# Patient Record
Sex: FEMALE | Race: BLACK OR AFRICAN AMERICAN | Employment: FULL TIME | URBAN - METROPOLITAN AREA
[De-identification: names, ages, dates, MRNs, and addresses within clinical notes are randomized per-mention and may not be internally consistent; named-entity substitution may affect disease eponyms.]

---

## 2017-01-09 ENCOUNTER — HOSPITAL ENCOUNTER (OUTPATIENT)
Dept: GENERAL RADIOLOGY | Age: 47
Discharge: HOME | End: 2017-01-09
Attending: SURGERY | Admitting: SURGERY

## 2017-01-09 DIAGNOSIS — K91.0 VOMITING FOLLOWING GASTROINTESTINAL SURGERY: ICD-10-CM

## 2017-01-09 DIAGNOSIS — K91.0: ICD-10-CM

## 2017-09-11 ENCOUNTER — HOSPITAL ENCOUNTER (OUTPATIENT)
Dept: ENDOSCOPY | Age: 47
Discharge: HOME | End: 2017-09-11
Attending: SURGERY | Admitting: SURGERY

## 2017-09-11 VITALS
OXYGEN SATURATION: 98 % | RESPIRATION RATE: 18 BRPM | HEIGHT: 67 IN | DIASTOLIC BLOOD PRESSURE: 76 MMHG | TEMPERATURE: 98.3 F | BODY MASS INDEX: 29.82 KG/M2 | SYSTOLIC BLOOD PRESSURE: 128 MMHG | HEART RATE: 77 BPM | WEIGHT: 190 LBS

## 2017-09-11 LAB — PREGNANCY, URINE: NEGATIVE

## 2017-09-11 RX ORDER — PHENTERMINE HYDROCHLORIDE 37.5 MG/1
TABLET ORAL DAILY
Refills: 0 | COMMUNITY
Start: 2017-08-21 | End: 2018-10-16

## 2017-09-11 RX ORDER — ESCITALOPRAM OXALATE 10 MG/1
10 TABLET ORAL DAILY
COMMUNITY

## 2018-10-16 ENCOUNTER — HOSPITAL ENCOUNTER (EMERGENCY)
Age: 48
Discharge: HOME | End: 2018-10-16
Attending: EMERGENCY MEDICINE
Payer: COMMERCIAL

## 2018-10-16 ENCOUNTER — APPOINTMENT (OUTPATIENT)
Dept: CT IMAGING | Age: 48
End: 2018-10-16
Payer: COMMERCIAL

## 2018-10-16 VITALS
WEIGHT: 174 LBS | OXYGEN SATURATION: 100 % | RESPIRATION RATE: 16 BRPM | DIASTOLIC BLOOD PRESSURE: 112 MMHG | HEART RATE: 78 BPM | HEIGHT: 66 IN | TEMPERATURE: 97.8 F | BODY MASS INDEX: 27.97 KG/M2 | SYSTOLIC BLOOD PRESSURE: 138 MMHG

## 2018-10-16 DIAGNOSIS — R10.13 ABDOMINAL PAIN, EPIGASTRIC: Primary | ICD-10-CM

## 2018-10-16 DIAGNOSIS — R11.2 NAUSEA AND VOMITING, INTRACTABILITY OF VOMITING NOT SPECIFIED, UNSPECIFIED VOMITING TYPE: ICD-10-CM

## 2018-10-16 LAB
ALBUMIN SERPL-MCNC: 4.3 G/DL (ref 3.4–5)
ALP BLD-CCNC: 64 U/L (ref 40–129)
ALT SERPL-CCNC: 15 U/L (ref 10–40)
ANION GAP SERPL CALCULATED.3IONS-SCNC: 9 MMOL/L (ref 3–16)
AST SERPL-CCNC: 18 U/L (ref 15–37)
BASOPHILS ABSOLUTE: 0 K/UL (ref 0–0.2)
BASOPHILS RELATIVE PERCENT: 0.5 %
BILIRUB SERPL-MCNC: 0.6 MG/DL (ref 0–1)
BILIRUBIN DIRECT: <0.2 MG/DL (ref 0–0.3)
BILIRUBIN URINE: NEGATIVE
BILIRUBIN, INDIRECT: NORMAL MG/DL (ref 0–1)
BLOOD, URINE: NEGATIVE
BUN BLDV-MCNC: 8 MG/DL (ref 7–20)
CALCIUM SERPL-MCNC: 10 MG/DL (ref 8.3–10.6)
CHLORIDE BLD-SCNC: 103 MMOL/L (ref 99–110)
CLARITY: CLEAR
CO2: 28 MMOL/L (ref 21–32)
COLOR: YELLOW
CREAT SERPL-MCNC: 0.9 MG/DL (ref 0.6–1.1)
EOSINOPHILS ABSOLUTE: 0 K/UL (ref 0–0.6)
EOSINOPHILS RELATIVE PERCENT: 0.2 %
GFR AFRICAN AMERICAN: >60
GFR NON-AFRICAN AMERICAN: >60
GLUCOSE BLD-MCNC: 136 MG/DL (ref 70–99)
GLUCOSE URINE: NEGATIVE MG/DL
HCG(URINE) PREGNANCY TEST: NEGATIVE
HCT VFR BLD CALC: 45.2 % (ref 36–48)
HEMOGLOBIN: 14.9 G/DL (ref 12–16)
KETONES, URINE: ABNORMAL MG/DL
LEUKOCYTE ESTERASE, URINE: NEGATIVE
LIPASE: 41 U/L (ref 13–60)
LYMPHOCYTES ABSOLUTE: 1 K/UL (ref 1–5.1)
LYMPHOCYTES RELATIVE PERCENT: 17.6 %
MCH RBC QN AUTO: 30.3 PG (ref 26–34)
MCHC RBC AUTO-ENTMCNC: 33 G/DL (ref 31–36)
MCV RBC AUTO: 91.8 FL (ref 80–100)
MICROSCOPIC EXAMINATION: ABNORMAL
MONOCYTES ABSOLUTE: 0.2 K/UL (ref 0–1.3)
MONOCYTES RELATIVE PERCENT: 3.2 %
NEUTROPHILS ABSOLUTE: 4.5 K/UL (ref 1.7–7.7)
NEUTROPHILS RELATIVE PERCENT: 78.5 %
NITRITE, URINE: NEGATIVE
PDW BLD-RTO: 13.7 % (ref 12.4–15.4)
PH UA: 7.5
PLATELET # BLD: 234 K/UL (ref 135–450)
PMV BLD AUTO: 9.5 FL (ref 5–10.5)
POTASSIUM REFLEX MAGNESIUM: 4.3 MMOL/L (ref 3.5–5.1)
PROTEIN UA: NEGATIVE MG/DL
RBC # BLD: 4.92 M/UL (ref 4–5.2)
SODIUM BLD-SCNC: 140 MMOL/L (ref 136–145)
SPECIFIC GRAVITY UA: 1.01
TOTAL PROTEIN: 7.2 G/DL (ref 6.4–8.2)
URINE TYPE: ABNORMAL
UROBILINOGEN, URINE: 1 E.U./DL
WBC # BLD: 5.7 K/UL (ref 4–11)

## 2018-10-16 PROCEDURE — 96374 THER/PROPH/DIAG INJ IV PUSH: CPT

## 2018-10-16 PROCEDURE — 80048 BASIC METABOLIC PNL TOTAL CA: CPT

## 2018-10-16 PROCEDURE — 6370000000 HC RX 637 (ALT 250 FOR IP): Performed by: EMERGENCY MEDICINE

## 2018-10-16 PROCEDURE — 85025 COMPLETE CBC W/AUTO DIFF WBC: CPT

## 2018-10-16 PROCEDURE — 99284 EMERGENCY DEPT VISIT MOD MDM: CPT

## 2018-10-16 PROCEDURE — 6360000002 HC RX W HCPCS: Performed by: PHYSICIAN ASSISTANT

## 2018-10-16 PROCEDURE — 81003 URINALYSIS AUTO W/O SCOPE: CPT

## 2018-10-16 PROCEDURE — 6360000004 HC RX CONTRAST MEDICATION: Performed by: EMERGENCY MEDICINE

## 2018-10-16 PROCEDURE — 96375 TX/PRO/DX INJ NEW DRUG ADDON: CPT

## 2018-10-16 PROCEDURE — 84703 CHORIONIC GONADOTROPIN ASSAY: CPT

## 2018-10-16 PROCEDURE — 74177 CT ABD & PELVIS W/CONTRAST: CPT

## 2018-10-16 PROCEDURE — 83690 ASSAY OF LIPASE: CPT

## 2018-10-16 PROCEDURE — 2580000003 HC RX 258: Performed by: PHYSICIAN ASSISTANT

## 2018-10-16 PROCEDURE — 80076 HEPATIC FUNCTION PANEL: CPT

## 2018-10-16 PROCEDURE — 96376 TX/PRO/DX INJ SAME DRUG ADON: CPT

## 2018-10-16 PROCEDURE — 96361 HYDRATE IV INFUSION ADD-ON: CPT

## 2018-10-16 RX ORDER — OMEPRAZOLE 40 MG/1
40 CAPSULE, DELAYED RELEASE ORAL DAILY
Qty: 30 CAPSULE | Refills: 3 | Status: ON HOLD | OUTPATIENT
Start: 2018-10-16 | End: 2020-02-02 | Stop reason: SDUPTHER

## 2018-10-16 RX ORDER — 0.9 % SODIUM CHLORIDE 0.9 %
1000 INTRAVENOUS SOLUTION INTRAVENOUS ONCE
Status: COMPLETED | OUTPATIENT
Start: 2018-10-16 | End: 2018-10-16

## 2018-10-16 RX ORDER — OXYCODONE HYDROCHLORIDE AND ACETAMINOPHEN 5; 325 MG/1; MG/1
1 TABLET ORAL EVERY 4 HOURS PRN
Qty: 20 TABLET | Refills: 0 | Status: SHIPPED | OUTPATIENT
Start: 2018-10-16 | End: 2018-10-23

## 2018-10-16 RX ORDER — ONDANSETRON 8 MG/1
8 TABLET, ORALLY DISINTEGRATING ORAL EVERY 8 HOURS PRN
Qty: 10 TABLET | Refills: 0 | Status: SHIPPED | OUTPATIENT
Start: 2018-10-16 | End: 2020-01-30 | Stop reason: ALTCHOICE

## 2018-10-16 RX ORDER — HYDROCODONE BITARTRATE AND ACETAMINOPHEN 5; 325 MG/1; MG/1
1-2 TABLET ORAL EVERY 6 HOURS PRN
Qty: 20 TABLET | Refills: 0 | Status: SHIPPED | OUTPATIENT
Start: 2018-10-16 | End: 2018-10-16 | Stop reason: CLARIF

## 2018-10-16 RX ORDER — ONDANSETRON 2 MG/ML
4 INJECTION INTRAMUSCULAR; INTRAVENOUS ONCE
Status: COMPLETED | OUTPATIENT
Start: 2018-10-16 | End: 2018-10-16

## 2018-10-16 RX ORDER — PROMETHAZINE HYDROCHLORIDE 25 MG/ML
12.5 INJECTION, SOLUTION INTRAMUSCULAR; INTRAVENOUS ONCE
Status: COMPLETED | OUTPATIENT
Start: 2018-10-16 | End: 2018-10-16

## 2018-10-16 RX ORDER — PROMETHAZINE HYDROCHLORIDE 25 MG/1
25 TABLET ORAL EVERY 6 HOURS PRN
Qty: 20 TABLET | Refills: 0 | Status: SHIPPED | OUTPATIENT
Start: 2018-10-16 | End: 2018-10-23

## 2018-10-16 RX ADMIN — IOPAMIDOL 75 ML: 755 INJECTION, SOLUTION INTRAVENOUS at 15:35

## 2018-10-16 RX ADMIN — SODIUM CHLORIDE 1000 ML: 9 INJECTION, SOLUTION INTRAVENOUS at 12:48

## 2018-10-16 RX ADMIN — ONDANSETRON HYDROCHLORIDE 4 MG: 2 SOLUTION INTRAMUSCULAR; INTRAVENOUS at 14:49

## 2018-10-16 RX ADMIN — PROMETHAZINE HYDROCHLORIDE 12.5 MG: 25 INJECTION INTRAMUSCULAR; INTRAVENOUS at 12:48

## 2018-10-16 RX ADMIN — PROMETHAZINE HYDROCHLORIDE 12.5 MG: 25 INJECTION INTRAMUSCULAR; INTRAVENOUS at 15:57

## 2018-10-16 RX ADMIN — LIDOCAINE HYDROCHLORIDE: 20 SOLUTION ORAL; TOPICAL at 16:58

## 2018-10-16 ASSESSMENT — ENCOUNTER SYMPTOMS
EYES NEGATIVE: 1
RECTAL PAIN: 0
BLOOD IN STOOL: 0
ABDOMINAL PAIN: 1
RESPIRATORY NEGATIVE: 1
VOMITING: 1
DIARRHEA: 1
NAUSEA: 1

## 2018-10-16 ASSESSMENT — PAIN DESCRIPTION - ORIENTATION: ORIENTATION: MID;UPPER

## 2018-10-16 ASSESSMENT — PAIN SCALES - GENERAL: PAINLEVEL_OUTOF10: 7

## 2018-10-16 ASSESSMENT — PAIN DESCRIPTION - PAIN TYPE: TYPE: ACUTE PAIN

## 2018-10-16 ASSESSMENT — PAIN DESCRIPTION - LOCATION: LOCATION: ABDOMEN

## 2018-10-16 NOTE — ED NOTES
Assume patient care at this time. Agree with previous assessment - fluids running per order. Current Plan of Care reviewed with patient. Pt denies any needs or questions at this time. Bed locked and in low position, with side rails up x 2.       Kathleen Seals RN  10/16/18 7957

## 2018-10-16 NOTE — ED PROVIDER NOTES
Neurological: She is alert and oriented to person, place, and time. Skin: Skin is warm and dry. Capillary refill takes less than 2 seconds. Psychiatric: She has a normal mood and affect. Her behavior is normal. Judgment and thought content normal.   Nursing note and vitals reviewed. DIAGNOSTIC RESULTS   LABS:    Labs Reviewed   BASIC METABOLIC PANEL W/ REFLEX TO MG FOR LOW K - Abnormal; Notable for the following:        Result Value    Glucose 136 (*)     All other components within normal limits    Narrative:     Performed at:  [facility] [address]   Phone [phone]   URINALYSIS - Abnormal; Notable for the following:     Ketones, Urine TRACE (*)     All other components within normal limits    Narrative:     Performed at:  [facility] [address]   Phone [phone]   CBC WITH AUTO DIFFERENTIAL    Narrative:     Performed at:  [facility] [address]   Phone [phone]   LIPASE    Narrative:     Performed at:  [facility] [address] [place]   Phone [phone]   HEPATIC FUNCTION PANEL    Narrative:     Performed at:  [facility] [address]   Phone [phone]   PREGNANCY, URINE    Narrative:     Performed at:  [facility] [address]   Phone [phone]       All other labs were within normal range or not returned as of this dictation. EKG: All EKG's are interpreted by the Emergency Department Physician who either signs orCo-signs this chart in the absence of a cardiologist.  Please see their note for interpretation of EKG.       RADIOLOGY:   Non-plain film images such as CT, Ultrasound and MRI are read by the radiologist. Kayce Younger mis-transcribed.)    Anjana Villareal PA-C (electronically signed)            Anjana Villareal PA-C  10/18/18 0576

## 2018-10-16 NOTE — ED PROVIDER NOTES
I independently performed a history and physical on [employer]. All diagnostic, treatment, and disposition decisions were made by myself in conjunction with the advanced practice provider. Briefly, this is a 53 y.o. female here for epigastric abdominal pain nausea and vomiting    On exam, epigastric abdominal tenderness, heart regular rate and rhythm no murmur rub or gallop, lung sounds clear and equal bilaterally. EKG      Screenings            MDM  The patient presents at this time acute abdominal pain. The differential diagnosis includes acute surgical abdomen, sepsis, referred pain from a cardiac or pulmonary source, pregnancy related disorders in females, dehydration, electrolyte disorder, urinary tract infection or stone, as well as nonspecific abdominal pain or medication related disorders. Patient's workup here reveals a CT scan of the abdomen and pelvis with no significant acute findings. A likely chronic inflammation of the terminal ileum. Also history of previous gastric staples. Labs reviewed without significant acute actionable abnormality. Patient given IV hydration and pain control here. Patient was given a GI cocktail as well which did help relieve her symptoms the most.  She was able to tolerate some minimal oral intake here. Her vital signs are stable. At this point there does not appear to be an acute cause of her pain requiring hospitalization at this time. She has not seen GI in the past.  She is recommended to follow-up with them. A follow-up has been given. Patient is discharged to home with lidocaine and instructions to take Maalox for a complete GI cocktail. She is also given prescription of Percocet and Zofran for pain and nausea control. Return for worsening or concerning symptoms. Benign abdominal exam.  Please see RICHARD note for further details. .      Patient Referrals:  Arpita Storey MD  [address]  [facility]  [phone]    In 1

## 2020-01-30 ENCOUNTER — HOSPITAL ENCOUNTER (INPATIENT)
Age: 50
LOS: 3 days | Discharge: HOME | DRG: 389 | End: 2020-02-02
Attending: EMERGENCY MEDICINE | Admitting: HOSPITALIST
Payer: COMMERCIAL

## 2020-01-30 ENCOUNTER — APPOINTMENT (OUTPATIENT)
Dept: GENERAL RADIOLOGY | Age: 50
DRG: 389 | End: 2020-01-30
Payer: COMMERCIAL

## 2020-01-30 ENCOUNTER — APPOINTMENT (OUTPATIENT)
Dept: CT IMAGING | Age: 50
DRG: 389 | End: 2020-01-30
Payer: COMMERCIAL

## 2020-01-30 PROBLEM — K56.609 SBO (SMALL BOWEL OBSTRUCTION) (HCC): Status: ACTIVE | Noted: 2020-01-30

## 2020-01-30 LAB
A/G RATIO: 1.5 (ref 1.1–2.2)
ALBUMIN SERPL-MCNC: 4.7 G/DL (ref 3.4–5)
ALP BLD-CCNC: 60 U/L (ref 40–129)
ALT SERPL-CCNC: 14 U/L (ref 10–40)
AMYLASE: 106 U/L (ref 25–115)
ANION GAP SERPL CALCULATED.3IONS-SCNC: 17 MMOL/L (ref 3–16)
AST SERPL-CCNC: 17 U/L (ref 15–37)
BACTERIA: ABNORMAL /HPF
BASOPHILS ABSOLUTE: 0.2 K/UL (ref 0–0.2)
BASOPHILS RELATIVE PERCENT: 1.7 %
BILIRUB SERPL-MCNC: 0.6 MG/DL (ref 0–1)
BILIRUBIN URINE: NEGATIVE
BLOOD, URINE: NEGATIVE
BUN BLDV-MCNC: 15 MG/DL (ref 7–20)
CALCIUM SERPL-MCNC: 9.7 MG/DL (ref 8.3–10.6)
CHLORIDE BLD-SCNC: 99 MMOL/L (ref 99–110)
CLARITY: ABNORMAL
CO2: 20 MMOL/L (ref 21–32)
COLOR: YELLOW
CREAT SERPL-MCNC: 1 MG/DL (ref 0.6–1.1)
EKG ATRIAL RATE: 83 BPM
EKG DIAGNOSIS: NORMAL
EKG P AXIS: 61 DEGREES
EKG P-R INTERVAL: 146 MS
EKG Q-T INTERVAL: 420 MS
EKG QRS DURATION: 82 MS
EKG QTC CALCULATION (BAZETT): 493 MS
EKG R AXIS: -34 DEGREES
EKG T AXIS: 65 DEGREES
EKG VENTRICULAR RATE: 83 BPM
EOSINOPHILS ABSOLUTE: 0 K/UL (ref 0–0.6)
EOSINOPHILS RELATIVE PERCENT: 0.1 %
EPITHELIAL CELLS, UA: 6 /HPF (ref 0–5)
GFR AFRICAN AMERICAN: >60
GFR NON-AFRICAN AMERICAN: 59
GLOBULIN: 3.2 G/DL
GLUCOSE BLD-MCNC: 222 MG/DL (ref 70–99)
GLUCOSE URINE: NEGATIVE MG/DL
HCG QUALITATIVE: NEGATIVE
HCT VFR BLD CALC: 44.5 % (ref 36–48)
HEMOGLOBIN: 15.2 G/DL (ref 12–16)
HYALINE CASTS: 2 /LPF (ref 0–8)
KETONES, URINE: 15 MG/DL
LACTIC ACID, SEPSIS: 2.1 MMOL/L (ref 0.4–1.9)
LACTIC ACID, SEPSIS: 3.3 MMOL/L (ref 0.4–1.9)
LEUKOCYTE ESTERASE, URINE: NEGATIVE
LIPASE: 33 U/L (ref 13–60)
LYMPHOCYTES ABSOLUTE: 2.7 K/UL (ref 1–5.1)
LYMPHOCYTES RELATIVE PERCENT: 21.5 %
MCH RBC QN AUTO: 31.1 PG (ref 26–34)
MCHC RBC AUTO-ENTMCNC: 34.1 G/DL (ref 31–36)
MCV RBC AUTO: 91.3 FL (ref 80–100)
MICROSCOPIC EXAMINATION: YES
MONOCYTES ABSOLUTE: 0.3 K/UL (ref 0–1.3)
MONOCYTES RELATIVE PERCENT: 2.4 %
NEUTROPHILS ABSOLUTE: 9.5 K/UL (ref 1.7–7.7)
NEUTROPHILS RELATIVE PERCENT: 74.3 %
NITRITE, URINE: NEGATIVE
PDW BLD-RTO: 13.2 % (ref 12.4–15.4)
PH UA: 8.5 (ref 5–8)
PLATELET # BLD: 238 K/UL (ref 135–450)
PMV BLD AUTO: 9.5 FL (ref 5–10.5)
POTASSIUM SERPL-SCNC: 3.8 MMOL/L (ref 3.5–5.1)
PROTEIN UA: NEGATIVE MG/DL
RBC # BLD: 4.87 M/UL (ref 4–5.2)
RBC UA: 1 /HPF (ref 0–4)
SODIUM BLD-SCNC: 136 MMOL/L (ref 136–145)
SPECIFIC GRAVITY UA: 1.02 (ref 1–1.03)
TOTAL PROTEIN: 7.9 G/DL (ref 6.4–8.2)
TROPONIN: <0.01 NG/ML
URINE REFLEX TO CULTURE: YES
URINE TYPE: ABNORMAL
UROBILINOGEN, URINE: 0.2 E.U./DL
WBC # BLD: 12.8 K/UL (ref 4–11)
WBC UA: 6 /HPF (ref 0–5)

## 2020-01-30 PROCEDURE — 1200000000 HC SEMI PRIVATE

## 2020-01-30 PROCEDURE — 82150 ASSAY OF AMYLASE: CPT

## 2020-01-30 PROCEDURE — 83690 ASSAY OF LIPASE: CPT

## 2020-01-30 PROCEDURE — 94760 N-INVAS EAR/PLS OXIMETRY 1: CPT

## 2020-01-30 PROCEDURE — 93010 ELECTROCARDIOGRAM REPORT: CPT | Performed by: INTERNAL MEDICINE

## 2020-01-30 PROCEDURE — 2580000003 HC RX 258: Performed by: SURGERY

## 2020-01-30 PROCEDURE — 2580000003 HC RX 258: Performed by: HOSPITALIST

## 2020-01-30 PROCEDURE — 6370000000 HC RX 637 (ALT 250 FOR IP): Performed by: EMERGENCY MEDICINE

## 2020-01-30 PROCEDURE — 84703 CHORIONIC GONADOTROPIN ASSAY: CPT

## 2020-01-30 PROCEDURE — 99222 1ST HOSP IP/OBS MODERATE 55: CPT | Performed by: SURGERY

## 2020-01-30 PROCEDURE — 99291 CRITICAL CARE FIRST HOUR: CPT

## 2020-01-30 PROCEDURE — 2580000003 HC RX 258: Performed by: NURSE PRACTITIONER

## 2020-01-30 PROCEDURE — 96365 THER/PROPH/DIAG IV INF INIT: CPT

## 2020-01-30 PROCEDURE — 83605 ASSAY OF LACTIC ACID: CPT

## 2020-01-30 PROCEDURE — 6360000004 HC RX CONTRAST MEDICATION: Performed by: EMERGENCY MEDICINE

## 2020-01-30 PROCEDURE — 6360000002 HC RX W HCPCS: Performed by: NURSE PRACTITIONER

## 2020-01-30 PROCEDURE — 96376 TX/PRO/DX INJ SAME DRUG ADON: CPT

## 2020-01-30 PROCEDURE — 84484 ASSAY OF TROPONIN QUANT: CPT

## 2020-01-30 PROCEDURE — 87040 BLOOD CULTURE FOR BACTERIA: CPT

## 2020-01-30 PROCEDURE — 74177 CT ABD & PELVIS W/CONTRAST: CPT

## 2020-01-30 PROCEDURE — 71045 X-RAY EXAM CHEST 1 VIEW: CPT

## 2020-01-30 PROCEDURE — 87086 URINE CULTURE/COLONY COUNT: CPT

## 2020-01-30 PROCEDURE — 80053 COMPREHEN METABOLIC PANEL: CPT

## 2020-01-30 PROCEDURE — 93005 ELECTROCARDIOGRAM TRACING: CPT | Performed by: EMERGENCY MEDICINE

## 2020-01-30 PROCEDURE — 85025 COMPLETE CBC W/AUTO DIFF WBC: CPT

## 2020-01-30 PROCEDURE — 96375 TX/PRO/DX INJ NEW DRUG ADDON: CPT

## 2020-01-30 PROCEDURE — 2580000003 HC RX 258: Performed by: EMERGENCY MEDICINE

## 2020-01-30 PROCEDURE — 81001 URINALYSIS AUTO W/SCOPE: CPT

## 2020-01-30 RX ORDER — 0.9 % SODIUM CHLORIDE 0.9 %
1000 INTRAVENOUS SOLUTION INTRAVENOUS ONCE
Status: COMPLETED | OUTPATIENT
Start: 2020-01-30 | End: 2020-01-30

## 2020-01-30 RX ORDER — SODIUM CHLORIDE 0.9 % (FLUSH) 0.9 %
10 SYRINGE (ML) INJECTION EVERY 12 HOURS SCHEDULED
Status: DISCONTINUED | OUTPATIENT
Start: 2020-01-30 | End: 2020-02-02 | Stop reason: HOSPADM

## 2020-01-30 RX ORDER — NALTREXONE HYDROCHLORIDE AND BUPROPION HYDROCHLORIDE 8; 90 MG/1; MG/1
2 TABLET, EXTENDED RELEASE ORAL 2 TIMES DAILY
COMMUNITY
Start: 2020-01-20

## 2020-01-30 RX ORDER — SODIUM CHLORIDE 0.9 % (FLUSH) 0.9 %
10 SYRINGE (ML) INJECTION PRN
Status: DISCONTINUED | OUTPATIENT
Start: 2020-01-30 | End: 2020-02-02 | Stop reason: HOSPADM

## 2020-01-30 RX ORDER — HYDROMORPHONE HYDROCHLORIDE 1 MG/ML
0.5 INJECTION, SOLUTION INTRAMUSCULAR; INTRAVENOUS; SUBCUTANEOUS ONCE
Status: COMPLETED | OUTPATIENT
Start: 2020-01-30 | End: 2020-01-30

## 2020-01-30 RX ORDER — SODIUM CHLORIDE 9 MG/ML
INJECTION, SOLUTION INTRAVENOUS CONTINUOUS
Status: DISCONTINUED | OUTPATIENT
Start: 2020-01-30 | End: 2020-02-01

## 2020-01-30 RX ORDER — MORPHINE SULFATE 2 MG/ML
2 INJECTION, SOLUTION INTRAMUSCULAR; INTRAVENOUS EVERY 4 HOURS PRN
Status: DISCONTINUED | OUTPATIENT
Start: 2020-01-30 | End: 2020-02-02 | Stop reason: HOSPADM

## 2020-01-30 RX ORDER — LIDOCAINE HYDROCHLORIDE 20 MG/ML
15 SOLUTION OROPHARYNGEAL ONCE
Status: COMPLETED | OUTPATIENT
Start: 2020-01-30 | End: 2020-01-30

## 2020-01-30 RX ORDER — DEXTROAMPHETAMINE SACCHARATE, AMPHETAMINE ASPARTATE MONOHYDRATE, DEXTROAMPHETAMINE SULFATE AND AMPHETAMINE SULFATE 6.25; 6.25; 6.25; 6.25 MG/1; MG/1; MG/1; MG/1
25 CAPSULE, EXTENDED RELEASE ORAL EVERY MORNING
COMMUNITY
Start: 2020-01-06

## 2020-01-30 RX ORDER — ONDANSETRON 2 MG/ML
4 INJECTION INTRAMUSCULAR; INTRAVENOUS EVERY 30 MIN PRN
Status: COMPLETED | OUTPATIENT
Start: 2020-01-30 | End: 2020-01-30

## 2020-01-30 RX ORDER — MORPHINE SULFATE 4 MG/ML
4 INJECTION, SOLUTION INTRAMUSCULAR; INTRAVENOUS ONCE
Status: COMPLETED | OUTPATIENT
Start: 2020-01-30 | End: 2020-01-30

## 2020-01-30 RX ORDER — ONDANSETRON 2 MG/ML
4 INJECTION INTRAMUSCULAR; INTRAVENOUS EVERY 6 HOURS PRN
Status: DISCONTINUED | OUTPATIENT
Start: 2020-01-30 | End: 2020-02-02 | Stop reason: HOSPADM

## 2020-01-30 RX ADMIN — SODIUM CHLORIDE 1000 ML: 9 INJECTION, SOLUTION INTRAVENOUS at 11:28

## 2020-01-30 RX ADMIN — HYDROMORPHONE HYDROCHLORIDE 0.5 MG: 1 INJECTION, SOLUTION INTRAMUSCULAR; INTRAVENOUS; SUBCUTANEOUS at 10:11

## 2020-01-30 RX ADMIN — MORPHINE SULFATE 4 MG: 4 INJECTION INTRAVENOUS at 08:53

## 2020-01-30 RX ADMIN — SODIUM CHLORIDE 1000 ML: 9 INJECTION, SOLUTION INTRAVENOUS at 09:09

## 2020-01-30 RX ADMIN — ONDANSETRON 4 MG: 2 INJECTION INTRAMUSCULAR; INTRAVENOUS at 08:53

## 2020-01-30 RX ADMIN — SODIUM CHLORIDE: 9 INJECTION, SOLUTION INTRAVENOUS at 21:21

## 2020-01-30 RX ADMIN — PIPERACILLIN SODIUM AND TAZOBACTAM SODIUM 4.5 G: 4; .5 INJECTION, POWDER, LYOPHILIZED, FOR SOLUTION INTRAVENOUS at 09:46

## 2020-01-30 RX ADMIN — MORPHINE SULFATE 4 MG: 4 INJECTION INTRAVENOUS at 09:40

## 2020-01-30 RX ADMIN — IOPAMIDOL 75 ML: 755 INJECTION, SOLUTION INTRAVENOUS at 09:59

## 2020-01-30 RX ADMIN — ONDANSETRON 4 MG: 2 INJECTION INTRAMUSCULAR; INTRAVENOUS at 09:40

## 2020-01-30 RX ADMIN — LIDOCAINE HYDROCHLORIDE 15 ML: 20 SOLUTION ORAL; TOPICAL at 10:50

## 2020-01-30 RX ADMIN — SODIUM CHLORIDE: 9 INJECTION, SOLUTION INTRAVENOUS at 15:08

## 2020-01-30 ASSESSMENT — PAIN DESCRIPTION - ORIENTATION
ORIENTATION: MID;UPPER
ORIENTATION: MID;UPPER

## 2020-01-30 ASSESSMENT — ENCOUNTER SYMPTOMS
NAUSEA: 1
CHEST TIGHTNESS: 0
ABDOMINAL PAIN: 1
SHORTNESS OF BREATH: 0
VOMITING: 1
DIARRHEA: 1

## 2020-01-30 ASSESSMENT — PAIN DESCRIPTION - PAIN TYPE
TYPE: ACUTE PAIN

## 2020-01-30 ASSESSMENT — PAIN DESCRIPTION - FREQUENCY
FREQUENCY: CONTINUOUS
FREQUENCY: CONTINUOUS

## 2020-01-30 ASSESSMENT — PAIN SCALES - GENERAL
PAINLEVEL_OUTOF10: 10
PAINLEVEL_OUTOF10: 6
PAINLEVEL_OUTOF10: 0
PAINLEVEL_OUTOF10: 6
PAINLEVEL_OUTOF10: 9
PAINLEVEL_OUTOF10: 10
PAINLEVEL_OUTOF10: 0
PAINLEVEL_OUTOF10: 10
PAINLEVEL_OUTOF10: 4

## 2020-01-30 ASSESSMENT — PAIN DESCRIPTION - LOCATION
LOCATION: ABDOMEN

## 2020-01-30 ASSESSMENT — PAIN DESCRIPTION - PROGRESSION
CLINICAL_PROGRESSION: GRADUALLY IMPROVING
CLINICAL_PROGRESSION: GRADUALLY WORSENING

## 2020-01-30 NOTE — ED NOTES
This nurse has remained in room with patient since pt was roomed. Pt states pain is coming back. Inga Barney NP notified.       Wily Mcgarry RN  01/30/20 4703

## 2020-01-30 NOTE — ED NOTES
Pt back on bear hugger and warming fluids.  Pt placed on bedpan per request.      Pervis Slice Tritt-Schoen, RN  01/30/20 6881

## 2020-01-30 NOTE — ED NOTES
Pt states she is not in pain at the current moment. Pt appears more comfortable. VSS.       Rudolpho Frankel, RN  01/30/20 4924

## 2020-01-30 NOTE — ED NOTES
Pt in the bathroom. States she needs to have BM. Hat provided to catch stool sample and urine cup in case of need.       Kat Stevens RN  01/30/20 8238

## 2020-01-30 NOTE — ED NOTES
Report called to [facility] RN on 5T. Pt taken up by charge nurse, Clifton Orr RN with belongings.       Hiwot Zavaleta RN  01/30/20 2104

## 2020-01-30 NOTE — CONSULTS
[facility]     Consult                                                     Patient Name: Virginie Sandoval  MRN: [id number]  [place]: 1970  Admission date: 2020  8:23 AM   Date of evaluation: 2020  Primary Care Physician: Danielito Eubanks MD  Requesting provider: Orion GONZALEZ  Reason for consult: Abdominal pain  History of Present Illness:    Ms. Cathleen Man is a 51 y.o. female who presents with acute onset epigastric pain beginning this morning. Intermittent, sharp pain, unrelieved with antacid. Passed stool without relief, nausea and vomiting clear fluid but still with pain. Over several hours became worse and constant requiring ED evaluation and hospital admission. Associated with subjective fever, chills. Denies chest pain, dyspnea, dysuria or other complaints. Similar episode 2 months ago, resolved without evaluation or intervention. No significant medical conditions. Surgical history includes , abdominoplasty and sleeve gastrectomy. Father with lung cancer, mother with uterine cancer. No personal or family history of colorectal malignancy or inflammatory bowel disease. Workup in ED shows leukocytosis, lactic acidosis that is improving, and distal small bowel obstruction. NG attempted but not successful.  Patient says pain has resolved, nausea resolved    Past Medical History:        Diagnosis Date    Depression     Seasonal allergies        Past Surgical History:        Procedure Laterality Date    ABDOMINOPLASTY       SECTION      DILATION AND CURETTAGE OF UTERUS      LEEP  2012    SLEEVE GASTRECTOMY      WISDOM TOOTH EXTRACTION         Scheduled Meds:   sodium chloride flush  10 mL Intravenous 2 times per day    enoxaparin  40 mg Subcutaneous Nightly    [START ON 2020] influenza virus vaccine  0.5 mL Intramuscular Once     Continuous Infusions:   sodium chloride 100 mL/hr at 20 1508     PRN Meds:.sodium chloride flush, magnesium hydroxide, ondansetron, morphine    Prior to Admission medications    Medication Sig Start Date End Date Taking? Authorizing Provider   amphetamine-dextroamphetamine (ADDERALL XR) 25 MG extended release capsule Take 25 mg by mouth every morning. 1/6/20  Yes Historical Provider, MD   CONTRAVE 8-90 MG per extended release tablet Take 2 tablets by mouth 2 times daily  1/20/20  Yes Historical Provider, MD   omeprazole (PRILOSEC) 40 MG delayed release capsule Take 1 capsule by mouth daily 10/16/18  Yes Leticia Kapoor MD   escitalopram (LEXAPRO) 10 MG tablet Take 10 mg by mouth daily   Yes Historical Provider, MD   multivitamin [facility]) per tablet Take 1 tablet by mouth daily. Yes Historical Provider, MD        Allergies:  Aspirin; Codeine; and Vicodin [hydrocodone-acetaminophen]    Social History:   Social History     Socioeconomic History    Marital status:      Spouse name: None    Number of children: None    Years of education: None    Highest education level: None   Occupational History    None   Social Needs    Financial resource strain: None    Food insecurity:     Worry: None     Inability: None    Transportation needs:     Medical: None     Non-medical: None   Tobacco Use    Smoking status: Never Smoker    Smokeless tobacco: Never Used   Substance and Sexual Activity    Alcohol use:  Yes     Alcohol/week: 1.0 standard drinks     Types: 1 Glasses of wine per week     Comment: PER WEEK    Drug use: No    Sexual activity: None   Lifestyle    Physical activity:     Days per week: None     Minutes per session: None    Stress: None   Relationships    Social connections:     Talks on phone: None     Gets together: None     Attends Cheondoism service: None     Active member of club or organization: None     Attends meetings of clubs or organizations: None     Relationship status: None    Intimate partner violence:     Fear of current or ex partner: None Emotionally abused: None     Physically abused: None     Forced sexual activity: None   Other Topics Concern    None   Social History Narrative    None       Family History:    Family History   Problem Relation Age of Onset    High Blood Pressure Mother     Diabetes Mother     High Blood Pressure Father     High Cholesterol Father        Review of Systems:  CONSTITUTIONAL:  Negative except as above  HEENT:  negative  RESPIRATORY:  negative  CARDIOVASCULAR:  negative  GASTROINTESTINAL:  negative except as above   GENITOURINARY:  negative  HEMATOLOGIC/LYMPHATIC:  negative  NEUROLOGICAL:  Negative      Vital Signs:  Patient Vitals for the past 24 hrs:   BP Temp Temp src Pulse Resp SpO2 Height Weight   01/30/20 1453 130/78 97.8 °F (36.6 °C) Oral 86 18 95 % -- --   01/30/20 1415 119/71 -- -- 80 21 -- -- --   01/30/20 1345 139/81 -- -- 83 19 99 % -- --   01/30/20 1330 125/78 -- -- 80 19 100 % -- --   01/30/20 1315 (!) 129/99 -- -- 78 21 100 % -- --   01/30/20 1300 123/67 -- -- 84 21 99 % -- --   01/30/20 1200 (!) 143/93 -- -- 80 24 99 % -- --   01/30/20 1145 (!) 152/101 -- -- 80 24 99 % -- --   01/30/20 1130 (!) 150/99 -- -- 76 21 99 % -- --   01/30/20 1115 (!) 142/82 -- -- 79 23 99 % -- --   01/30/20 1100 (!) 142/88 -- -- 76 25 99 % -- --   01/30/20 1045 121/88 97.7 °F (36.5 °C) Oral 75 16 100 % -- --   01/30/20 1029 -- 94.5 °F (34.7 °C) Rectal -- -- -- -- --   01/30/20 1015 (!) 152/100 -- -- 76 17 100 % -- --   01/30/20 1011 (!) 161/89 -- -- 75 22 100 % -- --   01/30/20 0945 (!) 154/97 -- -- 76 25 100 % -- --   01/30/20 0936 -- -- -- 72 15 100 % -- --   01/30/20 0930 (!) 156/96 93.8 °F (34.3 °C) Rectal 72 22 100 % -- --   01/30/20 0925 -- -- -- 70 19 100 % -- --   01/30/20 0915 (!) 141/89 -- -- 74 26 100 % -- --   01/30/20 0901 -- 93.3 °F (34.1 °C) Rectal -- -- -- -- --   01/30/20 0900 (!) 147/87 -- -- 79 21 100 % -- --   01/30/20 0840 (!) 144/90 -- -- 80 15 100 % -- --   01/30/20 0817 (!) 142/87 -- -- 84 16 transition in the cecum is indeterminate. Xr Chest Portable    Result Date: 1/30/2020  EXAMINATION: ONE XRAY VIEW OF THE CHEST 1/30/2020 8:26 am COMPARISON: None. HISTORY: ORDERING SYSTEM PROVIDED HISTORY: CP TECHNOLOGIST PROVIDED HISTORY: Reason for exam:->CP Reason for Exam: Abdominal Pain (during triage pt requested to go to bathroom ASAP. Pt c/o upper gastric and other abd pain that started last night, +n/v/d. Pt is diaphoretic, unable to register temp. ) Acuity: Acute Type of Exam: Initial FINDINGS: Lordotic positioning. Heart size and pulmonary vessels normal.  Lungs clear. Costophrenic angles sharp     No active cardiopulmonary disease       Cultures:  Relevant cultures documented under results section     Assessment:  Patient Active Problem List   Diagnosis    SBO (small bowel obstruction) (HCC)     Small bowel obstruction    Plan:  1. The patient has a small bowel obstruction that is likely from adhesive disease. Up to 80% will respond to maximal conservative measures within 2-3 days. If she does not respond to conservative management or clinically deteriorates, may need surgical exploration  2. If not significantly better by 1-2 days, will repeat CT with PO contrast or order small bowel follow through which may be both diagnostic and therapeutic  3. Attempted NG unsuccessfully, if nausea and/or pain worsens will need another attempt at NG for bowel decompression  4. IVF resuscitation, monitor lactic acid  5. Monitor leukocytosis, does not need antibiotics  6. Pain medication and antiemetics as needed with caution as they may mask a worsening exam  7. Close monitoring of electrolytes, electrolyte derangement can alter bowel function  8. Will stage nutrition with tomorrow's labs. Currently the risk and cost of TPN far outweigh the benefits  9. Defer management of remainder of medical comorbidities to primary and consulting teams    This plan was discussed at length with the patient.  She was

## 2020-01-30 NOTE — ED PROVIDER NOTES
[address]        Pt Name: Maggie Rae  MRN: [id number]  [place] 1970  Date of evaluation: 2020  Provider: JOSE Gonzalez - CNP  PCP: Janeth Goodman MD    This patient was seen and evaluated by the attending physician Basil Alberto COMPLAINT       Chief Complaint   Patient presents with    Abdominal Pain     during triage pt requested to go to bathroom ASAP. Pt c/o upper gastric and other abd pain that started last night, +n/v/d. Pt is diaphoretic, unable to register temp. HISTORY OF PRESENT ILLNESS   (Location/Symptom, Timing/Onset, Context/Setting, Quality, Duration, Modifying Factors, Severity)  Note limiting factors. Maggie Rae is a 51 y.o. female presentsto the emergency department with complaint of upper abdominal pain with diaphoresis. She reports onset of pain last night but worsening this morning. She reports nausea with vomiting and some diarrhea. She describes the pain as sharp and stabbing, rates as 10 of 10. She left today from work feeling ill. Reports history of  section. No other abdominal surgeries. She is cool to the touch. Diaphoretic. Denies any headache, fever, lightheadedness, dizziness, visual disturbances. No chest pain or pressure. No neck or back pain. No shortness of breath, cough, or congestion. No constipation, or dysuria. No rash. Nursing Notes were all reviewed and agreed with or any disagreements were addressed in the HPI. REVIEW OF SYSTEMS    (2-9 systems for level 4, 10 or more for level 5)     Review of Systems   Constitutional: Positive for diaphoresis. Negative for activity change, chills and fever. Respiratory: Negative for chest tightness and shortness of breath. Cardiovascular: Negative for chest pain. Gastrointestinal: Positive for abdominal pain, diarrhea, nausea and vomiting. Genitourinary: Negative for dysuria.    All other systems reviewed and are negative. Positives and Pertinent negatives as per HPI. Except as noted above in the ROS, all other systems were reviewed and negative. PAST MEDICAL HISTORY     Past Medical History:   Diagnosis Date    Depression     Seasonal allergies          SURGICAL HISTORY     Past Surgical History:   Procedure Laterality Date    ABDOMINOPLASTY       SECTION      DILATION AND CURETTAGE OF UTERUS      LEEP  2012    SLEEVE GASTRECTOMY      WISDOM TOOTH EXTRACTION           [address]       Current Discharge Medication List      CONTINUE these medications which have NOT CHANGED    Details   amphetamine-dextroamphetamine (ADDERALL XR) 25 MG extended release capsule Take 25 mg by mouth every morning. CONTRAVE 8-90 MG per extended release tablet Take 2 tablets by mouth 2 times daily       omeprazole (PRILOSEC) 40 MG delayed release capsule Take 1 capsule by mouth daily  Qty: 30 capsule, Refills: 3      escitalopram (LEXAPRO) 10 MG tablet Take 10 mg by mouth daily      multivitamin (THERAGRAN) per tablet Take 1 tablet by mouth daily. ALLERGIES     Aspirin; Codeine; and Vicodin [hydrocodone-acetaminophen]    FAMILYHISTORY       Family History   Problem Relation Age of Onset    High Blood Pressure Mother     Diabetes Mother     High Blood Pressure Father     High Cholesterol Father           SOCIAL HISTORY       Social History     Tobacco Use    Smoking status: Never Smoker    Smokeless tobacco: Never Used   Substance Use Topics    Alcohol use:  Yes     Alcohol/week: 1.0 standard drinks     Types: 1 Glasses of wine per week     Comment: PER WEEK    Drug use: No       SCREENINGS    Drumright Coma Scale  Eye Opening: Spontaneous  Best Verbal Response: Oriented  Best Motor Response: Obeys commands  Valentine Coma Scale Score: 15        PHYSICAL EXAM    (up to 7 for level 4, 8 or more for level 5)     ED Triage Vitals [20 0817]   BP Temp Temp All other components within normal limits    Narrative:     Performed at:  [facility] [address], [place]   Phone [phone]   LACTATE, SEPSIS - Abnormal; Notable for the following components:    Lactic Acid, Sepsis 2.1 (*)     All other components within normal limits    Narrative:     Performed at:  [facility] [address] [employer], [place]   Phone [phone]   URINE RT REFLEX TO CULTURE - Abnormal; Notable for the following components:    Clarity, UA CLOUDY (*)     Ketones, Urine 15 (*)     pH, UA 8.5 (*)     All other components within normal limits    Narrative:     Performed at:  [facility] [address] [employer]   Phone [phone]   MICROSCOPIC URINALYSIS - Abnormal; Notable for the following components:    Bacteria, UA RARE (*)     WBC, UA 6 (*)     Epi Cells 6 (*)     All other components within normal limits    Narrative:     Performed at:  [facility] [address] [employer], [place]   Phone [phone]   CULTURE BLOOD #1   CULTURE BLOOD #2   URINE CULTURE   TROPONIN    Narrative:     Performed at:  [facility] [address] [employer], [place]   Phone [phone]   AMYLASE    Narrative:     Performed at:  [facility] [address], [place]   Phone [phone]   LIPASE    Narrative:     Performed at:  [facility] [address], [place]   Phone [phone]   HCG, SERUM, QUALITATIVE    Narrative:     Performed at:  [facility] [address], [place]   Phone [phone]       All other labs were within normal range or not returned as of this dictation. EKG:  All EKG's are interpreted by the Emergency Department Physician in the absence of a cardiologist.  Please see their note for interpretation of EKG. RADIOLOGY:   Non-plain film images such as CT, Ultrasound and MRI are read by the radiologist. Plain radiographic images are visualized and preliminarily interpreted by the  ED Provider with the below findings:        Interpretation per the Radiologist below, if available at the time of this note:    CT ABDOMEN PELVIS W IV CONTRAST Additional Contrast? None   Final Result   Findings concerning for distal small bowel obstruction. Obstruction may   alternately be located in the cecum. There is apparent sharp transition   within the cecum demonstrated on the images discussed above. Close follow-up   is recommended, surgical consultation should be considered. Cause of the   sharp transition in the cecum is indeterminate. XR CHEST PORTABLE   Final Result   No active cardiopulmonary disease           No results found. PROCEDURES   Unless otherwise noted below, none     Procedures    CRITICAL CARE TIME   The total critical care time spent while evaluating and treating this patient was at least 41 minutes. This excludes time spent doing separately billable procedures. This includes time at the bedside, data interpretation, medication management, obtaining critical history from collateral sources if the patient is unable to provide it directly, and physician consultation. Specifics of interventions taken and potentially life-threatening diagnostic considerations are listed above in the medical decision making.       CONSULTS:  [address] CONSULT TO HOSPITALIST  IP CONSULT TO HOSPITALIST      EMERGENCY DEPARTMENT COURSE and DIFFERENTIAL DIAGNOSIS/MDM:   Vitals:    Vitals:    01/30/20 1330 01/30/20 1345 01/30/20 1415 01/30/20 1453   BP: 125/78 139/81 119/71 130/78   Pulse: 80 83 80 86   Resp: 19 19 21 18   Temp:    97.8 °F (36.6 °C)   TempSrc:    Oral   SpO2: 100% 99%  95%   Weight:       Height:

## 2020-01-30 NOTE — ED NOTES
Physician called to room as pt appears acutely ill. Moaning in pain. Pt cold to touch, diaphoretic. Unable to get oral temperature. Family present at bedside.       Hiwot Zavaleta RN  01/30/20 6856

## 2020-01-30 NOTE — ED NOTES
Pharmacy Medication History Note      List of current medications patient is taking is complete. Source of information: [facility]    Changes made to medication list:  Medications flagged for removal (include reason, ex. noncompliance):  N/A    Medications removed (include reason, ex. therapy complete or physician discontinued): Contrave- dose adjustment  Ondansetron- therapy complete  Lidocaine- therapy complete    Medications added/doses adjusted:  Contrave- 2BID  Adderal ER 25mg- QD    Other notes (ex. Recent course of antibiotics, Coumadin dosing):  Denies use of other OTC or herbal medications. Last dose times updated. [place]    No current facility-administered medications on file prior to encounter. Current Outpatient Medications on File Prior to Encounter   Medication Sig Dispense Refill    amphetamine-dextroamphetamine (ADDERALL XR) 25 MG extended release capsule Take 25 mg by mouth every morning. CONTRAVE 8-90 MG per extended release tablet Take 2 tablets by mouth 2 times daily       omeprazole (PRILOSEC) 40 MG delayed release capsule Take 1 capsule by mouth daily 30 capsule 3    escitalopram (LEXAPRO) 10 MG tablet Take 10 mg by mouth daily      multivitamin (THERAGRAN) per tablet Take 1 tablet by mouth daily.         [DISCONTINUED] Naltrexone-Bupropion HCl (CONTRAVE PO) Take by mouth      [DISCONTINUED] ondansetron (ZOFRAN ODT) 8 MG TBDP disintegrating tablet Take 1 tablet by mouth every 8 hours as needed for Nausea 10 tablet 0    [DISCONTINUED] lidocaine viscous (XYLOCAINE) 2 % solution Take 10 mLs by mouth as needed for Irritation 100 mL 0

## 2020-01-30 NOTE — ED PROVIDER NOTES
I personally evaluated and examined the patient in conjunction with the RICHARD and agree with the assessment, treatment plan and disposition of the patient as recorded by the RICHARD. I reviewed pertinent nursing notes, triage notes, vital signs, past medical history, family and social history, medications, and allergies. Complete review of systems was conducted by the RICHARD and/or myself. Review of systems is negative except as documented in the history of present illness. Brief HPI: 71-year-old female presents the emergency department chief complaint of generalized abdominal pain sharp, 8/10, no radiation. No alleviating or aggravating factors. No diarrhea. Positive nausea and nonbloody emesis. No diarrhea. No recent bad food, sick contacts or recent travel. Reports history of previous  and abdominoplasty-remote    Physical Exam: General: Patient is in no acute distress   Head: Normocephalic, atraumatic, pupils are equal and reactive to light. EOMI. Neck: Neck is supple. No JVD noted. Heart: RRR no murmurs, rubs, or gallops   Lungs: CTA BL   Abdomen: soft, tender to palpation in the lower abdomen without guarding. Non-distended   Extremities: no lower extremity edema. Capillary refill is less than 2 seconds   Skin: no cyanosis or pallor; no rashes noted   Neuro: CN's 2-12 are grossly intact. No focal neurologic deficit appreciated. Abdominal labs ordered including blood cultures and lactate. 30 cc/kg of estimated ideal body weight IV fluids ordered. Broad-spectrum antibiotics to cover for GI pathogens initiated given patient's hypothermia and leukocytosis as she does meet Sirs criteria. CT shows bowel obstruction. NG tube ordered. Case discussed with Dr. Landon Jc surgery and hospitalist to admit. Note that warmed IV fluids and Gerald hugger was utilized.       CRITICAL CARE TIME: 35 minutes excluding billable procedure time: aggressive fluid resuscitation in sepsis, warming of hypothermic pt, multiple re-evaluations, complex MDM, potential for deterioration. FINAL IMPRESSION     1. SBO (small bowel obstruction) ([facility] [address]    2. Hypothermia, initial encounter    3. Non-intractable vomiting with nausea, unspecified vomiting type    4. Abdominal pain, epigastric    5. Lactic acid acidosis    6. SIRS (systemic inflammatory response syndrome) (HCC)            Electronically signed by:   Rossana Neal D.O.             [facility], DO  01/30/20 [address] [facility] Haider Be, DO  01/30/20 1123

## 2020-01-30 NOTE — ED NOTES
Pt on continuous pulse ox reading and cycling BP post medication.        Sean Edwards RN  01/30/20 2747

## 2020-01-30 NOTE — H&P
HOSPITALISTS HISTORY AND PHYSICAL    2020 11:11 AM    Patient Information:  Rachelle Ramirez is a 51 y.o. female [id number]  PCP:  Marquez Rousseau MD (Tel: [phone] )    Chief complaint:    Chief Complaint   Patient presents with    Abdominal Pain     during triage pt requested to go to bathroom ASAP. Pt c/o upper gastric and other abd pain that started last night, +n/v/d. Pt is diaphoretic, unable to register temp. History of Present Illness: Braxton Cortes is a 51 y.o. female who presented with complaints up upper abdominal pain and nausea. Patient has having pain since last night worsening this morning. Rates pain 10 out of 10 sharp stabbing pain nonradiating. Had to leave work feeling ill. Patient denies any history of surgery. No recent sick contacts no recent travel no food no recent surgery for nothing that makes it better or worse. REVIEW OF SYSTEMS:   Constitutional: Negative for fever,chills or night sweats  ENT: Negative for rhinorrhea, epistaxis, hoarseness, sore throat. Respiratory: Negative for shortness of breath,wheezing  Cardiovascular: Negative for chest pain, palpitations   Gastrointestinal: Negative for nausea, vomiting, diarrhea  Genitourinary: Negative for polyuria, dysuria   Hematologic/Lymphatic: Negative for bleeding tendency, easy bruising  Musculoskeletal: Negative for myalgias and arthralgias  Neurologic: Negative for confusion,dysarthria. Skin: Negative for itching,rash, good capillary refill. Psychiatric: Negative for depression,anxiety, agitation. Endocrine: Negative for polydipsia,polyuria,heat /cold intolerance. Past Medical History:   has a past medical history of Depression and Seasonal allergies. Past Surgical History:   has a past surgical history that includes  section; Sleeve Gastrectomy; [place] tooth extraction; Dilation and curettage of uterus; and LEEP (2012). Medications:  No current facility-administered medications on file prior to encounter. Current Outpatient Medications on File Prior to Encounter   Medication Sig Dispense Refill    amphetamine-dextroamphetamine (ADDERALL XR) 25 MG extended release capsule Take 25 mg by mouth every morning.  CONTRAVE 8-90 MG per extended release tablet Take 2 tablets by mouth 2 times daily       omeprazole (PRILOSEC) 40 MG delayed release capsule Take 1 capsule by mouth daily 30 capsule 3    escitalopram (LEXAPRO) 10 MG tablet Take 10 mg by mouth daily      multivitamin (THERAGRAN) per tablet Take 1 tablet by mouth daily. Allergies: Allergies   Allergen Reactions    Aspirin Nausea And Vomiting and Other (See Comments)     EPIGASTRIC  BURNING    Codeine Nausea And Vomiting    Vicodin [Hydrocodone-Acetaminophen] Nausea And Vomiting        Social History:   reports that she has never smoked. She has never used smokeless tobacco. She reports current alcohol use of about 1.0 standard drinks of alcohol per week. She reports that she does not use drugs. Family History:  family history includes Diabetes in her mother; High Blood Pressure in her father and mother; High Cholesterol in her father. ,     Physical Exam:  BP (!) 142/88   Pulse 76   Temp 97.7 °F (36.5 °C) (Oral)   Resp 25   Ht 5' 6\" (1.676 m)   Wt 179 lb (81.2 kg)   SpO2 99%   BMI 28.89 kg/m²     General appearance:  Appears comfortable. Well nourished  Eyes: Sclera clear, pupils equal  ENT: Moist mucus membranes, no thrush. Trachea midline. Cardiovascular: Regular rhythm, normal S1, S2. No murmur, gallop, rub. No edema in lower extremities  Respiratory: Clear to auscultation bilaterally, no wheeze, good inspiratory effort  Gastrointestinal: Abdomen soft, tender to touch the lower abdomen without guarding nondistended  Musculoskeletal: No cyanosis in digits, neck supple  Neurology: Cranial nerves grossly intact.  Alert and oriented in

## 2020-01-31 LAB
A/G RATIO: 1.6 (ref 1.1–2.2)
ALBUMIN SERPL-MCNC: 3.5 G/DL (ref 3.4–5)
ALP BLD-CCNC: 44 U/L (ref 40–129)
ALT SERPL-CCNC: 11 U/L (ref 10–40)
ANION GAP SERPL CALCULATED.3IONS-SCNC: 8 MMOL/L (ref 3–16)
APTT: 26 SEC (ref 24.2–36.2)
AST SERPL-CCNC: 16 U/L (ref 15–37)
BASOPHILS ABSOLUTE: 0 K/UL (ref 0–0.2)
BASOPHILS RELATIVE PERCENT: 0.5 %
BILIRUB SERPL-MCNC: 0.6 MG/DL (ref 0–1)
BUN BLDV-MCNC: 6 MG/DL (ref 7–20)
CALCIUM SERPL-MCNC: 8.5 MG/DL (ref 8.3–10.6)
CHLORIDE BLD-SCNC: 110 MMOL/L (ref 99–110)
CO2: 23 MMOL/L (ref 21–32)
CREAT SERPL-MCNC: 0.8 MG/DL (ref 0.6–1.1)
EOSINOPHILS ABSOLUTE: 0 K/UL (ref 0–0.6)
EOSINOPHILS RELATIVE PERCENT: 0.1 %
GFR AFRICAN AMERICAN: >60
GFR NON-AFRICAN AMERICAN: >60
GLOBULIN: 2.2 G/DL
GLUCOSE BLD-MCNC: 98 MG/DL (ref 70–99)
HCT VFR BLD CALC: 37.3 % (ref 36–48)
HEMOGLOBIN: 12.6 G/DL (ref 12–16)
INR BLD: 1.14 (ref 0.86–1.14)
LACTIC ACID: 1.2 MMOL/L (ref 0.4–2)
LYMPHOCYTES ABSOLUTE: 1.4 K/UL (ref 1–5.1)
LYMPHOCYTES RELATIVE PERCENT: 19.6 %
MAGNESIUM: 2 MG/DL (ref 1.8–2.4)
MCH RBC QN AUTO: 31.2 PG (ref 26–34)
MCHC RBC AUTO-ENTMCNC: 33.7 G/DL (ref 31–36)
MCV RBC AUTO: 92.7 FL (ref 80–100)
MONOCYTES ABSOLUTE: 0.4 K/UL (ref 0–1.3)
MONOCYTES RELATIVE PERCENT: 5.9 %
NEUTROPHILS ABSOLUTE: 5.1 K/UL (ref 1.7–7.7)
NEUTROPHILS RELATIVE PERCENT: 73.9 %
PDW BLD-RTO: 13.3 % (ref 12.4–15.4)
PHOSPHORUS: 2.5 MG/DL (ref 2.5–4.9)
PLATELET # BLD: 174 K/UL (ref 135–450)
PMV BLD AUTO: 9.3 FL (ref 5–10.5)
POTASSIUM SERPL-SCNC: 3.8 MMOL/L (ref 3.5–5.1)
PREALBUMIN: 19.9 MG/DL (ref 20–40)
PROTHROMBIN TIME: 13.3 SEC (ref 10–13.2)
RBC # BLD: 4.03 M/UL (ref 4–5.2)
SODIUM BLD-SCNC: 141 MMOL/L (ref 136–145)
TOTAL PROTEIN: 5.7 G/DL (ref 6.4–8.2)
TRANSFERRIN: 198 MG/DL (ref 200–360)
URINE CULTURE, ROUTINE: NORMAL
WBC # BLD: 6.9 K/UL (ref 4–11)

## 2020-01-31 PROCEDURE — 85025 COMPLETE CBC W/AUTO DIFF WBC: CPT

## 2020-01-31 PROCEDURE — 83605 ASSAY OF LACTIC ACID: CPT

## 2020-01-31 PROCEDURE — APPNB30 APP NON BILLABLE TIME 0-30 MINS: Performed by: NURSE PRACTITIONER

## 2020-01-31 PROCEDURE — 90686 IIV4 VACC NO PRSV 0.5 ML IM: CPT | Performed by: HOSPITALIST

## 2020-01-31 PROCEDURE — 85730 THROMBOPLASTIN TIME PARTIAL: CPT

## 2020-01-31 PROCEDURE — 6360000002 HC RX W HCPCS: Performed by: HOSPITALIST

## 2020-01-31 PROCEDURE — 83735 ASSAY OF MAGNESIUM: CPT

## 2020-01-31 PROCEDURE — APPSS15 APP SPLIT SHARED TIME 0-15 MINUTES: Performed by: NURSE PRACTITIONER

## 2020-01-31 PROCEDURE — 1200000000 HC SEMI PRIVATE

## 2020-01-31 PROCEDURE — 84134 ASSAY OF PREALBUMIN: CPT

## 2020-01-31 PROCEDURE — 99232 SBSQ HOSP IP/OBS MODERATE 35: CPT | Performed by: SURGERY

## 2020-01-31 PROCEDURE — 80053 COMPREHEN METABOLIC PANEL: CPT

## 2020-01-31 PROCEDURE — 84466 ASSAY OF TRANSFERRIN: CPT

## 2020-01-31 PROCEDURE — 85610 PROTHROMBIN TIME: CPT

## 2020-01-31 PROCEDURE — G0008 ADMIN INFLUENZA VIRUS VAC: HCPCS | Performed by: HOSPITALIST

## 2020-01-31 PROCEDURE — 84100 ASSAY OF PHOSPHORUS: CPT

## 2020-01-31 PROCEDURE — 2580000003 HC RX 258: Performed by: SURGERY

## 2020-01-31 PROCEDURE — 36415 COLL VENOUS BLD VENIPUNCTURE: CPT

## 2020-01-31 RX ADMIN — SODIUM CHLORIDE: 9 INJECTION, SOLUTION INTRAVENOUS at 03:13

## 2020-01-31 RX ADMIN — SODIUM CHLORIDE: 9 INJECTION, SOLUTION INTRAVENOUS at 16:59

## 2020-01-31 RX ADMIN — SODIUM CHLORIDE: 9 INJECTION, SOLUTION INTRAVENOUS at 23:57

## 2020-01-31 RX ADMIN — INFLUENZA A VIRUS A/BRISBANE/02/2018 IVR-190 (H1N1) ANTIGEN (PROPIOLACTONE INACTIVATED), INFLUENZA A VIRUS A/KANSAS/14/2017 X-327 (H3N2) ANTIGEN (PROPIOLACTONE INACTIVATED), INFLUENZA B VIRUS B/MARYLAND/15/2016 ANTIGEN (PROPIOLACTONE INACTIVATED), INFLUENZA B VIRUS B/PHUKET/3073/2013 BVR-1B ANTIGEN (PROPIOLACTONE INACTIVATED) 0.5 ML: 15; 15; 15; 15 INJECTION, SUSPENSION INTRAMUSCULAR at 08:20

## 2020-01-31 ASSESSMENT — PAIN DESCRIPTION - PROGRESSION
CLINICAL_PROGRESSION: GRADUALLY IMPROVING

## 2020-01-31 ASSESSMENT — PAIN SCALES - GENERAL
PAINLEVEL_OUTOF10: 0

## 2020-01-31 NOTE — PROGRESS NOTES
and oriented in time, place and person  Skin: Warm, dry, normal turgor    Labs and Tests:  CBC:   Recent Labs     01/30/20  0846 01/31/20  0725   WBC 12.8* 6.9   HGB 15.2 12.6    174     BMP:    Recent Labs     01/30/20  0846 01/31/20  0725    141   K 3.8 3.8   CL 99 110   CO2 20* 23   BUN 15 6*   CREATININE 1.0 0.8   GLUCOSE 222* 98     Hepatic:   Recent Labs     01/30/20  0846 01/31/20  0725   AST 17 16   ALT 14 11   BILITOT 0.6 0.6   ALKPHOS 60 44       Discussed care with family and patient             Spent 30  minutes with patient and family at bedside and on unit reviewing medical records and labs, spent greater than 50% time counseling patient and family on diagnosis and plan   Problem List  Active Problems:    SBO (small bowel obstruction) ([facility] [address]  Resolved Problems:    * No resolved hospital problems. *       Assessment & Plan:   1.  Small bowel obstruction  -No bowel movement but passing flatus-continue conservative treatment  -Start clear liquid diet today  -Management per general surgery        Diet: DIET CLEAR LIQUID;  Code:Full Code  DVT PPX lovenox       Cruz Conde MD   1/31/2020 10:55 AM

## 2020-01-31 NOTE — PLAN OF CARE
Pt admitted from ED, A&Ox4, VSS, no c/o pain or N/V, general surgery MD at bedside- stated ok to hold off on NG tube for now, IVF infusing as ordered, pt NPO except for ice chips, AM labs ordered, will continue to monitor and treat as ordered
continuum of care needs are met  1/31/2020 1711 by Zach Lopez RN  Outcome: Ongoing  1/31/2020 0617 by Nida Sanders RN  Outcome: Ongoing     Problem:  Bowel/Gastric:  Goal: Control of bowel function will improve  Description  Control of bowel function will improve  1/31/2020 1711 by Zach Lopez RN  Outcome: Ongoing  1/31/2020 0617 by Nida Sanders RN  Outcome: Ongoing  Goal: Ability to achieve a regular elimination pattern will improve  Description  Ability to achieve a regular elimination pattern will improve  1/31/2020 1711 by Zach Lopez RN  Outcome: Ongoing  1/31/2020 0617 by Nida Sanders RN  Outcome: Ongoing     Problem: Nutritional:  Goal: Ability to follow a diet with enough fiber (20 to 30 grams) for normal bowel function will improve  Description  Ability to follow a diet with enough fiber (20 to 30 grams) for normal bowel function will improve  1/31/2020 1711 by Zach Lopez RN  Outcome: Ongoing  1/31/2020 0617 by Nida Sanders RN  Outcome: Ongoing     Problem: Skin Integrity:  Goal: Risk for impaired skin integrity will decrease  Description  Risk for impaired skin integrity will decrease  1/31/2020 1711 by Zach Lopez RN  Outcome: Ongoing  1/31/2020 0617 by Nida Sanders RN  Outcome: Ongoing   Pt A&Ox4, VSS, no c/o pain, tolerating clear liquid diet, up ad lucy, will continue to monitor and treat as ordered

## 2020-01-31 NOTE — PROGRESS NOTES
films:    Ct Abdomen Pelvis W Iv Contrast Additional Contrast? None    Result Date: 1/30/2020  EXAMINATION: CT OF THE ABDOMEN AND PELVIS WITH CONTRAST 1/30/2020 9:59 am TECHNIQUE: CT of the abdomen and pelvis was performed with the administration of intravenous contrast. Multiplanar reformatted images are provided for review. Dose modulation, iterative reconstruction, and/or weight based adjustment of the mA/kV was utilized to reduce the radiation dose to as low as reasonably achievable. COMPARISON: October 16, 2018 HISTORY: ORDERING SYSTEM PROVIDED HISTORY: abd pain TECHNOLOGIST PROVIDED HISTORY: If patient is on cardiac monitor and/or pulse ox, they may be taken off cardiac monitor and pulse ox, left on O2 if currently on. All monitors reattached when patient returns to room. Additional Contrast?->None Reason for exam:->abd pain Reason for Exam: abd pain Acuity: Unknown Type of Exam: Unknown FINDINGS: Lower Chest: No evidence of pneumonia or other acute findings. Organs: No acute abnormality of the organs of the abdomen. No evidence of pancreatitis. No ureteral stone or hydronephrosis. No evidence of pyelonephritis. GI/Bowel: There is fluid filled dilation involving much of the small bowel. A discrete location of transition not be definitively determined within the small bowel. There is fluid distending a portion of the base of the cecum with rapid transition to mostly collapsed colon, demonstrated on axial image 74 and coronal image 98. The appendix is difficult to definitively identify, grossly no evidence of appendicitis. Postsurgical changes from gastric sleeve again noted. . Pelvis: Rounded fluid density in the posterior pelvis either due to focal accumulation of fluid or an ovarian cyst measuring 3.6 cm. IUD in good position. Bladder appears normal. Peritoneum/Retroperitoneum: Small amount of free fluid the pelvis versus ovarian cyst as discussed above. No free fluid in the abdomen. No free air. Bones/Soft Tissues: No fracture or other acute osseous process. Findings concerning for distal small bowel obstruction. Obstruction may alternately be located in the cecum. There is apparent sharp transition within the cecum demonstrated on the images discussed above. Close follow-up is recommended, surgical consultation should be considered. Cause of the sharp transition in the cecum is indeterminate. Xr Chest Portable    Result Date: 1/30/2020  EXAMINATION: ONE XRAY VIEW OF THE CHEST 1/30/2020 8:26 am COMPARISON: None. HISTORY: ORDERING SYSTEM PROVIDED HISTORY: CP TECHNOLOGIST PROVIDED HISTORY: Reason for exam:->CP Reason for Exam: Abdominal Pain (during triage pt requested to go to bathroom ASAP. Pt c/o upper gastric and other abd pain that started last night, +n/v/d. Pt is diaphoretic, unable to register temp. ) Acuity: Acute Type of Exam: Initial FINDINGS: Lordotic positioning. Heart size and pulmonary vessels normal.  Lungs clear. Costophrenic angles sharp     No active cardiopulmonary disease       Scheduled Meds:   sodium chloride flush  10 mL Intravenous 2 times per day    enoxaparin  40 mg Subcutaneous Nightly     Continuous Infusions:   sodium chloride 150 mL/hr at 01/31/20 0313     PRN Meds:.sodium chloride flush, magnesium hydroxide, ondansetron, morphine      Assessment:  51 y.o. female admitted with   1. SBO (small bowel obstruction) ([facility] [address]    2. Hypothermia, initial encounter    3. Non-intractable vomiting with nausea, unspecified vomiting type    4. Abdominal pain, epigastric    5. Lactic acid acidosis    6. SIRS (systemic inflammatory response syndrome) (HCC)        Small bowel obstruction       Plan:  1. Pain and nausea resolved, passing flatus; advance to clear liquid diet as tolerated  2. IV hydration; monitor and correct electrolytes  3. Activity as tolerated  4. PRN analgesics and antiemetics--minimizing narcotics as tolerated  5. DVT prophylaxis with Lovenox  6.  Management of medical comorbid etiologies per primary team and consulting services  7. Disposition: Anticipate discharge home in the next 24-48 hours    EDUCATION:  Educated patient on plan of care and disease process--all questions answered. Plans discussed with patient and nursing. Reviewed and discussed with Dr. Yasmine Snider. Signed:  JOSE Cook - CNP  1/31/2020 9:44 AM    I have personally performed a face to face diagnostic evaluation on this patient. I have interviewed and examined the patient and I agree with the assessment above. In summary, my findings and plan are the following:   Ms. Bettyjo Lanes is a 51 y.o. female who presents with   Small bowel obstruction    Plan:  1. Clinically improving, no nausea or pain  2. Monitor bowel function, passing flatus but no stool  3. Advance to clear liquids  4. Monitor and replace electrolytes  5. Will repeat CT as outpatient regarding possible findings in distal ileum/cecum, do not need inpatient workup unless condition deteriorates  6. Pain medication and antiemetics as needed with caution as may mask worsening exam  7. Discharge planning, anticipate 1-2 days from surgical standpoint    Shun Snider MD, FACS  1/31/2020  6:26 PM

## 2020-02-01 ENCOUNTER — APPOINTMENT (OUTPATIENT)
Dept: GENERAL RADIOLOGY | Age: 50
DRG: 389 | End: 2020-02-01
Payer: COMMERCIAL

## 2020-02-01 LAB
ANION GAP SERPL CALCULATED.3IONS-SCNC: 7 MMOL/L (ref 3–16)
BASOPHILS ABSOLUTE: 0 K/UL (ref 0–0.2)
BASOPHILS RELATIVE PERCENT: 0.3 %
BUN BLDV-MCNC: 6 MG/DL (ref 7–20)
CALCIUM SERPL-MCNC: 8.2 MG/DL (ref 8.3–10.6)
CHLORIDE BLD-SCNC: 110 MMOL/L (ref 99–110)
CO2: 23 MMOL/L (ref 21–32)
CREAT SERPL-MCNC: 0.7 MG/DL (ref 0.6–1.1)
EOSINOPHILS ABSOLUTE: 0 K/UL (ref 0–0.6)
EOSINOPHILS RELATIVE PERCENT: 0.4 %
GFR AFRICAN AMERICAN: >60
GFR NON-AFRICAN AMERICAN: >60
GLUCOSE BLD-MCNC: 87 MG/DL (ref 70–99)
HCT VFR BLD CALC: 35.5 % (ref 36–48)
HEMOGLOBIN: 12 G/DL (ref 12–16)
LYMPHOCYTES ABSOLUTE: 2.1 K/UL (ref 1–5.1)
LYMPHOCYTES RELATIVE PERCENT: 41.6 %
MCH RBC QN AUTO: 31 PG (ref 26–34)
MCHC RBC AUTO-ENTMCNC: 33.7 G/DL (ref 31–36)
MCV RBC AUTO: 92.1 FL (ref 80–100)
MONOCYTES ABSOLUTE: 0.4 K/UL (ref 0–1.3)
MONOCYTES RELATIVE PERCENT: 7.8 %
NEUTROPHILS ABSOLUTE: 2.5 K/UL (ref 1.7–7.7)
NEUTROPHILS RELATIVE PERCENT: 49.9 %
PDW BLD-RTO: 13.7 % (ref 12.4–15.4)
PLATELET # BLD: 167 K/UL (ref 135–450)
PMV BLD AUTO: 9.4 FL (ref 5–10.5)
POTASSIUM SERPL-SCNC: 4.1 MMOL/L (ref 3.5–5.1)
RBC # BLD: 3.85 M/UL (ref 4–5.2)
SODIUM BLD-SCNC: 140 MMOL/L (ref 136–145)
WBC # BLD: 5 K/UL (ref 4–11)

## 2020-02-01 PROCEDURE — 74019 RADEX ABDOMEN 2 VIEWS: CPT

## 2020-02-01 PROCEDURE — 80048 BASIC METABOLIC PNL TOTAL CA: CPT

## 2020-02-01 PROCEDURE — 99232 SBSQ HOSP IP/OBS MODERATE 35: CPT | Performed by: SURGERY

## 2020-02-01 PROCEDURE — 85025 COMPLETE CBC W/AUTO DIFF WBC: CPT

## 2020-02-01 PROCEDURE — 36415 COLL VENOUS BLD VENIPUNCTURE: CPT

## 2020-02-01 PROCEDURE — 2580000003 HC RX 258: Performed by: HOSPITALIST

## 2020-02-01 PROCEDURE — 2580000003 HC RX 258: Performed by: SURGERY

## 2020-02-01 PROCEDURE — 1200000000 HC SEMI PRIVATE

## 2020-02-01 RX ADMIN — Medication 10 ML: at 09:17

## 2020-02-01 RX ADMIN — Medication 10 ML: at 21:12

## 2020-02-01 RX ADMIN — SODIUM CHLORIDE: 9 INJECTION, SOLUTION INTRAVENOUS at 06:20

## 2020-02-01 ASSESSMENT — PAIN SCALES - GENERAL
PAINLEVEL_OUTOF10: 0

## 2020-02-01 NOTE — PROGRESS NOTES
oriented to person, place and time. Skin:  Warm and dry. Assessment & Plan 56-year-old female seen in follow-up for a small bowel obstruction. Doing better. Tolerating clear liquids and passing some flatus. Her abdominal pain has resolved. Will check abdominal x-rays. If abdominal x-rays show improvement, will advance to a general diet. Hep-Lock IV. Home in a.m.  if tolerating general diet.     Shana Perez MD  2/1/2020

## 2020-02-01 NOTE — PROGRESS NOTES
dry, normal turgor    Labs and Tests:  CBC:   Recent Labs     01/30/20  0846 01/31/20  0725 02/01/20  0551   WBC 12.8* 6.9 5.0   HGB 15.2 12.6 12.0    174 167     BMP:    Recent Labs     01/30/20  0846 01/31/20  0725 02/01/20  0551    141 140   K 3.8 3.8 4.1   CL 99 110 110   CO2 20* 23 23   BUN 15 6* 6*   CREATININE 1.0 0.8 0.7   GLUCOSE 222* 98 87     Hepatic:   Recent Labs     01/30/20  0846 01/31/20  0725   AST 17 16   ALT 14 11   BILITOT 0.6 0.6   ALKPHOS 60 44       Discussed care with family and patient             Spent 30  minutes with patient and family at bedside and on unit reviewing medical records and labs, spent greater than 50% time counseling patient and family on diagnosis and plan   Problem List  Active Problems:    SBO (small bowel obstruction) ([facility] [address]  Resolved Problems:    * No resolved hospital problems. *       Assessment & Plan:   1.  Small bowel obstruction  -No bowel movement but passing flatus-continue conservative treatment  X-ray does not show any obstruction  -And for advancing diet today  -Management per general surgery        Diet: DIET CLEAR LIQUID;  Code:Full Code  DVT PPX lovenox  Home tomorrow      Greg Torres MD   2/1/2020 1:31 PM

## 2020-02-02 VITALS
SYSTOLIC BLOOD PRESSURE: 120 MMHG | HEIGHT: 66 IN | OXYGEN SATURATION: 96 % | HEART RATE: 68 BPM | BODY MASS INDEX: 28.77 KG/M2 | DIASTOLIC BLOOD PRESSURE: 74 MMHG | RESPIRATION RATE: 16 BRPM | WEIGHT: 179 LBS | TEMPERATURE: 98.1 F

## 2020-02-02 LAB
ANION GAP SERPL CALCULATED.3IONS-SCNC: 9 MMOL/L (ref 3–16)
BASOPHILS ABSOLUTE: 0 K/UL (ref 0–0.2)
BASOPHILS RELATIVE PERCENT: 0.5 %
BUN BLDV-MCNC: 10 MG/DL (ref 7–20)
CALCIUM SERPL-MCNC: 8.2 MG/DL (ref 8.3–10.6)
CHLORIDE BLD-SCNC: 105 MMOL/L (ref 99–110)
CO2: 24 MMOL/L (ref 21–32)
CREAT SERPL-MCNC: 0.8 MG/DL (ref 0.6–1.1)
EOSINOPHILS ABSOLUTE: 0 K/UL (ref 0–0.6)
EOSINOPHILS RELATIVE PERCENT: 0.7 %
GFR AFRICAN AMERICAN: >60
GFR NON-AFRICAN AMERICAN: >60
GLUCOSE BLD-MCNC: 85 MG/DL (ref 70–99)
HCT VFR BLD CALC: 38 % (ref 36–48)
HEMOGLOBIN: 12.8 G/DL (ref 12–16)
LYMPHOCYTES ABSOLUTE: 2.3 K/UL (ref 1–5.1)
LYMPHOCYTES RELATIVE PERCENT: 39.7 %
MCH RBC QN AUTO: 31 PG (ref 26–34)
MCHC RBC AUTO-ENTMCNC: 33.7 G/DL (ref 31–36)
MCV RBC AUTO: 91.9 FL (ref 80–100)
MONOCYTES ABSOLUTE: 0.4 K/UL (ref 0–1.3)
MONOCYTES RELATIVE PERCENT: 7 %
NEUTROPHILS ABSOLUTE: 3 K/UL (ref 1.7–7.7)
NEUTROPHILS RELATIVE PERCENT: 52.1 %
PDW BLD-RTO: 13.1 % (ref 12.4–15.4)
PLATELET # BLD: 184 K/UL (ref 135–450)
PMV BLD AUTO: 9.9 FL (ref 5–10.5)
POTASSIUM SERPL-SCNC: 4 MMOL/L (ref 3.5–5.1)
RBC # BLD: 4.14 M/UL (ref 4–5.2)
SODIUM BLD-SCNC: 138 MMOL/L (ref 136–145)
WBC # BLD: 5.8 K/UL (ref 4–11)

## 2020-02-02 PROCEDURE — 99232 SBSQ HOSP IP/OBS MODERATE 35: CPT | Performed by: SURGERY

## 2020-02-02 PROCEDURE — 80048 BASIC METABOLIC PNL TOTAL CA: CPT

## 2020-02-02 PROCEDURE — 94760 N-INVAS EAR/PLS OXIMETRY 1: CPT

## 2020-02-02 PROCEDURE — 36415 COLL VENOUS BLD VENIPUNCTURE: CPT

## 2020-02-02 PROCEDURE — 2580000003 HC RX 258: Performed by: HOSPITALIST

## 2020-02-02 PROCEDURE — 85025 COMPLETE CBC W/AUTO DIFF WBC: CPT

## 2020-02-02 RX ORDER — OMEPRAZOLE 40 MG/1
40 CAPSULE, DELAYED RELEASE ORAL DAILY
Qty: 30 CAPSULE | Refills: 0 | Status: SHIPPED | OUTPATIENT
Start: 2020-02-02

## 2020-02-02 RX ADMIN — Medication 10 ML: at 09:16

## 2020-02-02 ASSESSMENT — PAIN SCALES - GENERAL
PAINLEVEL_OUTOF10: 0

## 2020-02-02 NOTE — PROGRESS NOTES
Shift assessment completed. Routine vitals stable. Patient is awake, alert and oriented. Respirations are easy and unlabored. Patient does not appear to be in distress. Call light within reach.

## 2020-02-02 NOTE — CARE COORDINATION
Patient discharged 2/2/2020 to home. All discharge needs met per case management.     [address] [facility], [facility], [facility]

## 2020-02-02 NOTE — PROGRESS NOTES
Baudilio Zazueta is a 51 y.o. female patient. CC-abdominal pain    HPI-51year-old female seen in follow-up for a small bowel obstruction  Current Facility-Administered Medications   Medication Dose Route Frequency Provider Last Rate Last Dose    sodium chloride flush 0.9 % injection 10 mL  10 mL Intravenous 2 times per day Greg Torres MD   10 mL at 02/02/20 0916    sodium chloride flush 0.9 % injection 10 mL  10 mL Intravenous PRN Scarlet Jon MD        magnesium hydroxide (MILK OF MAGNESIA) 400 MG/5ML suspension 30 mL  30 mL Oral Daily PRN Scarlet Jon MD        ondansetron (ZOFRAN) injection 4 mg  4 mg Intravenous Q6H PRN Sacrlet Jon MD        enoxaparin (LOVENOX) injection 40 mg  40 mg Subcutaneous Nightly Scarlet Jon MD        morphine (PF) injection 2 mg  2 mg Intravenous Q4H PRN Scarlet Jon MD         Allergies   Allergen Reactions    Aspirin Nausea And Vomiting and Other (See Comments)     EPIGASTRIC  BURNING    Codeine Nausea And Vomiting    Vicodin [Hydrocodone-Acetaminophen] Nausea And Vomiting     Active Problems:    SBO (small bowel obstruction) ([facility] [address]  Resolved Problems:    * No resolved hospital problems. *    Blood pressure 120/74, pulse 68, temperature 98.1 °F (36.7 °C), temperature source Oral, resp. rate 16, height 5' 6\" (1.676 m), weight 179 lb (81.2 kg), SpO2 96 %, not currently breastfeeding. Subjective:  Symptoms:  Stable. Pain:  She reports no pain. Objective:  General Appearance:  Comfortable. Vital signs: (most recent): Blood pressure 120/74, pulse 68, temperature 98.1 °F (36.7 °C), temperature source Oral, resp. rate 16, height 5' 6\" (1.676 m), weight 179 lb (81.2 kg), SpO2 96 %, not currently breastfeeding. Output: Producing urine. Lungs:  Normal effort and normal respiratory rate. Abdomen: Abdomen is soft and non-distended. There is no abdominal tenderness. Neurological: Patient is alert and oriented to person, place and time.     Skin:

## 2020-02-03 LAB
BLOOD CULTURE, ROUTINE: NORMAL
CULTURE, BLOOD 2: NORMAL

## 2020-02-04 NOTE — DISCHARGE SUMMARY
[address] [facility] DISCHARGE SUMMARY    Patient Demographics    Patient. Lynnann Mcburney  Date of Birth. 1970  MRN. [id number]     Primary care provider. Jef Ram MD  (Tel: [phone])    Admit date: 1/30/2020    Discharge date (blank if same as Note Date): 2/2/2020  Note Date: 2/4/2020     Reason for Hospitalization. Chief Complaint   Patient presents with    Abdominal Pain     during triage pt requested to go to bathroom ASAP. Pt c/o upper gastric and other abd pain that started last night, +n/v/d. Pt is diaphoretic, unable to register temp. [facility]. Small bowel obstruction  -Patient was admitted abdominal pain nausea vomiting found to have small bowel obstruction treated with conservative management. Patient was kept n.p.o. Patient on the day of discharge had bowel movement passing flatus evaluated by general surgery outpatient follow-up    Consults. IP CONSULT TO GENERAL SURGERY  IP CONSULT TO HOSPITALIST  IP CONSULT TO HOSPITALIST    Physical examination on discharge day. /74   Pulse 68   Temp 98.1 °F (36.7 °C) (Oral)   Resp 16   Ht 5' 6\" (1.676 m)   Wt 179 lb (81.2 kg)   SpO2 96%   BMI 28.89 kg/m²   General appearance. Alert. Looks comfortable. HEENT. Sclera clear. Moist mucus membranes. Cardiovascular. Regular rate and rhythm, normal S1, S2. No murmur. Respiratory. Not using accessory muscles. Clear to auscultation bilaterally, no wheeze. Gastrointestinal. Abdomen soft, non-tender, not distended, normal bowel sounds  Neurology. Facial symmetry. No speech deficits. Moving all extremities equally. Extremities. No edema in lower extremities. Skin. Warm, dry, normal turgor    Condition at time of discharge stable     Medication instructions provided to patient at discharge.      Medication List      CONTINUE taking these medications amphetamine-dextroamphetamine 25 MG extended release capsule  Commonly known as:  ADDERALL XR     Contrave 8-90 MG per extended release tablet  Generic drug:  naltrexone-bupropion     Lexapro 10 MG tablet  Generic drug:  escitalopram     multivitamin per tablet     omeprazole 40 MG delayed release capsule  Commonly known as:  PRILOSEC  Take 1 capsule by mouth daily        STOP taking these medications    lidocaine viscous 2 % solution  Commonly known as:  XYLOCAINE     ondansetron 8 MG Tbdp disintegrating tablet  Commonly known as:  Zofran ODT           Where to Get Your Medications      You can get these medications from any pharmacy    Bring a paper prescription for each of these medications  · omeprazole 40 MG delayed release capsule         Discharge recommendations given to patient. Follow Up. pcp in 1 week   Disposition. home  Activity. activity as tolerated  Diet: No diet orders on file      Spent 45  minutes in discharge process.     Signed:  Shelly De Jesus MD     2/4/2020 1:41 PM